# Patient Record
Sex: FEMALE | Race: OTHER | Employment: UNEMPLOYED | URBAN - METROPOLITAN AREA
[De-identification: names, ages, dates, MRNs, and addresses within clinical notes are randomized per-mention and may not be internally consistent; named-entity substitution may affect disease eponyms.]

---

## 2023-05-19 ENCOUNTER — HOSPITAL ENCOUNTER (EMERGENCY)
Facility: HOSPITAL | Age: 23
Discharge: HOME | End: 2023-05-19
Attending: EMERGENCY MEDICINE

## 2023-05-19 ENCOUNTER — APPOINTMENT (OUTPATIENT)
Facility: HOSPITAL | Age: 23
End: 2023-05-19

## 2023-05-19 VITALS
SYSTOLIC BLOOD PRESSURE: 121 MMHG | DIASTOLIC BLOOD PRESSURE: 62 MMHG | TEMPERATURE: 98.5 F | WEIGHT: 130 LBS | HEIGHT: 64 IN | BODY MASS INDEX: 22.2 KG/M2 | RESPIRATION RATE: 16 BRPM | HEART RATE: 73 BPM | OXYGEN SATURATION: 100 %

## 2023-05-19 DIAGNOSIS — S86.912A STRAIN OF LEFT KNEE, INITIAL ENCOUNTER: Primary | ICD-10-CM

## 2023-05-19 LAB — ECHO BSA: 1.63 M2

## 2023-05-19 PROCEDURE — 73562 X-RAY EXAM OF KNEE 3: CPT

## 2023-05-19 PROCEDURE — 99284 EMERGENCY DEPT VISIT MOD MDM: CPT

## 2023-05-19 PROCEDURE — 93971 EXTREMITY STUDY: CPT

## 2023-05-19 RX ORDER — DICLOFENAC SODIUM 75 MG/1
75 TABLET, DELAYED RELEASE ORAL 2 TIMES DAILY
Qty: 20 TABLET | Refills: 3 | Status: SHIPPED | OUTPATIENT
Start: 2023-05-19

## 2023-05-19 ASSESSMENT — PAIN - FUNCTIONAL ASSESSMENT: PAIN_FUNCTIONAL_ASSESSMENT: NONE - DENIES PAIN

## 2023-05-19 NOTE — ED TRIAGE NOTES
Pt to er for c/o left knee pain, redness and swelling. Pt states it has been going on for 2 weeks and has gotten worse, and she is now unable to put any weight on it.  Pt denies any recent injury

## 2023-05-19 NOTE — ED NOTES
Pt given dc instructions and education by provider. Pt ambulatory out of ER w/ crutches and steady gait.      Zulema Hair RN  05/19/23 [id number]

## 2023-05-20 ASSESSMENT — ENCOUNTER SYMPTOMS
SHORTNESS OF BREATH: 0
ABDOMINAL PAIN: 0
BACK PAIN: 0

## 2023-05-20 NOTE — ED PROVIDER NOTES
[facility] EMERGENCY DEPT  EMERGENCY DEPARTMENT ENCOUNTER       Pt Name: Mary Crawford  MRN: [id number]  [place] 2000  Date of evaluation: 5/19/2023  Provider: RACHAEL Domínguez NP   PCP: Pcp No  Note Started: 1:02 PM 5/20/23     CHIEF COMPLAINT       Chief Complaint   Patient presents with    Knee Pain        HISTORY OF PRESENT ILLNESS: 1 or more elements      History Provided by: Patient   History is limited by:  used     Mary Crawford is a 24 y.o. female who presents   Ambulatory to the emergency department. Patient reports left knee pain left lower leg pain. [facility] and was advised to come to the emergency room for an ultrasound to determine if she had a clot in her lower leg. Patient denies history of DVT. Denies history of PE. Denies shortness of breath. She does report pain on weightbearing she denies injury. He has no other complaints at this time  Nursing Notes were all reviewed and agreed with or any disagreements were addressed in the HPI. REVIEW OF SYSTEMS      Review of Systems   Constitutional:  Negative for fever. HENT:  Negative for congestion. Eyes:  Negative for visual disturbance. Respiratory:  Negative for shortness of breath. Cardiovascular:  Negative for chest pain. Gastrointestinal:  Negative for abdominal pain. Genitourinary:  Negative for difficulty urinating. Musculoskeletal:  Negative for back pain and neck pain. Knee pain   Skin:  Negative for rash. Neurological:  Negative for dizziness, weakness and headaches. Psychiatric/Behavioral:  Negative for behavioral problems. All other systems reviewed and are negative. Positives and Pertinent negatives as per HPI. PAST HISTORY     Past Medical History:  No past medical history on file. Past Surgical History:  No past surgical history on file. Family History:  No family history on file.     Social History:

## 2023-05-22 LAB — ECHO BSA: 1.63 M2

## 2023-05-22 PROCEDURE — 93971 EXTREMITY STUDY: CPT | Performed by: INTERNAL MEDICINE
